# Patient Record
(demographics unavailable — no encounter records)

---

## 2025-07-15 NOTE — ASSESSMENT
[FreeTextEntry1] : Left flank pain CT abdomen pelvis did not reveal a source of her pain; Nephrolithiasis was not found To see GYN tomorrow; will see if she can do a pelvic ultrasound If an ultrasound is not done with GYN, will provide requisitions for abdominal and pelvic ultrasounds with [facility] As her symptoms seem to be related to bowel movements, we will initiate MiraLAX daily  See what PCP says next week;  Can schedule colonoscopy as well The patient will call with an update next week as to what her GYN and PCP recommend  The patient brings in copies of prior EGD (11/15/2024) and pathology report Documents from prior studies reviewed

## 2025-07-15 NOTE — REVIEW OF SYSTEMS
[As Noted in HPI] : as noted in HPI [Fever] : no fever [Chills] : no chills [Negative] : Genitourinary

## 2025-07-15 NOTE — HISTORY OF PRESENT ILLNESS
[FreeTextEntry1] : 83-year-old woman with hypercholesterolemia, hypertension  Patient last seen in this office 4 years ago At that time, she complained that since her gallbladder was removed 60 years ago, she had been having constipation, episodic epigastric pain and bloating, burping and gaseousness Bloating was addressed with a low FODMAP diet, especially avoiding dairy Constipation was addressed with dietary modification, along with MiraLAX (she had tried Linzess but that was more expensive and did not work any better than MiraLAX)  Celiac markers were equivocal; Duodenal biopsies were normal  Today she presents stating that she has been having left flank pain for the last 10 months Thought it was a kidney stone - Saw FARRUKH Clifford; who ordered a CT; no kidney stone, but a cyst on kidney; also noted a mural thickening of the stomach Had an EGD 11/15/24 in [place], Dr. Brito; normal  Still having pain in left flank - radiating to left groin Somewhat better after a BM, May be worse after eating - But always present BMs are irregular - takes MiraLAX - but not daily   Last colonoscopy 2019 (in [place] as well)